# Patient Record
Sex: MALE | Race: WHITE | Employment: FULL TIME | URBAN - NONMETROPOLITAN AREA
[De-identification: names, ages, dates, MRNs, and addresses within clinical notes are randomized per-mention and may not be internally consistent; named-entity substitution may affect disease eponyms.]

---

## 2022-01-10 ENCOUNTER — HOSPITAL ENCOUNTER (INPATIENT)
Age: 37
LOS: 4 days | Discharge: HOME | DRG: 885 | End: 2022-01-14
Attending: PSYCHIATRY & NEUROLOGY | Admitting: PSYCHIATRY & NEUROLOGY

## 2022-01-10 DIAGNOSIS — F32.A DEPRESSION WITH SUICIDAL IDEATION: ICD-10-CM

## 2022-01-10 DIAGNOSIS — T14.91XA SUICIDE ATTEMPT (HCC): Primary | ICD-10-CM

## 2022-01-10 DIAGNOSIS — R45.851 DEPRESSION WITH SUICIDAL IDEATION: ICD-10-CM

## 2022-01-10 DIAGNOSIS — F10.920 ACUTE ALCOHOLIC INTOXICATION WITHOUT COMPLICATION (HCC): ICD-10-CM

## 2022-01-10 PROBLEM — F10.20 ALCOHOL USE DISORDER, SEVERE, DEPENDENCE (HCC): Chronic | Status: ACTIVE | Noted: 2022-01-10

## 2022-01-10 PROBLEM — F33.2 MDD (MAJOR DEPRESSIVE DISORDER), RECURRENT SEVERE, WITHOUT PSYCHOSIS (HCC): Chronic | Status: ACTIVE | Noted: 2022-01-10

## 2022-01-10 PROBLEM — F33.9 MDD (MAJOR DEPRESSIVE DISORDER), RECURRENT EPISODE (HCC): Status: ACTIVE | Noted: 2022-01-10

## 2022-01-10 PROBLEM — F41.1 GAD (GENERALIZED ANXIETY DISORDER): Chronic | Status: ACTIVE | Noted: 2022-01-10

## 2022-01-10 LAB
ACETAMINOPHEN LEVEL: < 5 UG/ML (ref 0–20)
ALBUMIN SERPL-MCNC: 4.9 G/DL (ref 3.5–5.1)
ALLEN TEST: POSITIVE
ALP BLD-CCNC: 75 U/L (ref 38–126)
ALT SERPL-CCNC: 30 U/L (ref 11–66)
AMPHETAMINE+METHAMPHETAMINE URINE SCREEN: NEGATIVE
ANION GAP SERPL CALCULATED.3IONS-SCNC: 13 MEQ/L (ref 8–16)
AST SERPL-CCNC: 34 U/L (ref 5–40)
BARBITURATE QUANTITATIVE URINE: NEGATIVE
BASE EXCESS (CALCULATED): -1 MMOL/L (ref -2.5–2.5)
BASOPHILS # BLD: 0.9 %
BASOPHILS ABSOLUTE: 0.1 THOU/MM3 (ref 0–0.1)
BENZODIAZEPINE QUANTITATIVE URINE: NEGATIVE
BILIRUB SERPL-MCNC: 1.2 MG/DL (ref 0.3–1.2)
BILIRUBIN DIRECT: < 0.2 MG/DL (ref 0–0.3)
BILIRUBIN URINE: NEGATIVE
BLOOD, URINE: NEGATIVE
BUN BLDV-MCNC: 8 MG/DL (ref 7–22)
CALCIUM SERPL-MCNC: 9 MG/DL (ref 8.5–10.5)
CANNABINOID QUANTITATIVE URINE: NEGATIVE
CARBON MONOXIDE, BLOOD: 4.7 % CO SAT
CHARACTER, URINE: CLEAR
CHLORIDE BLD-SCNC: 95 MEQ/L (ref 98–111)
CO2: 25 MEQ/L (ref 23–33)
COCAINE METABOLITE QUANTITATIVE URINE: NEGATIVE
COLLECTED BY:: ABNORMAL
COLOR: NORMAL
CREAT SERPL-MCNC: 0.8 MG/DL (ref 0.4–1.2)
DEVICE: ABNORMAL
EOSINOPHIL # BLD: 3.7 %
EOSINOPHILS ABSOLUTE: 0.3 THOU/MM3 (ref 0–0.4)
ERYTHROCYTE [DISTWIDTH] IN BLOOD BY AUTOMATED COUNT: 12.8 % (ref 11.5–14.5)
ERYTHROCYTE [DISTWIDTH] IN BLOOD BY AUTOMATED COUNT: 42.6 FL (ref 35–45)
ETHYL ALCOHOL, SERUM: 0.07 %
ETHYL ALCOHOL, SERUM: 0.3 %
GFR SERPL CREATININE-BSD FRML MDRD: > 90 ML/MIN/1.73M2
GLUCOSE BLD-MCNC: 103 MG/DL (ref 70–108)
GLUCOSE URINE: NEGATIVE MG/DL
HCO3: 25 MMOL/L (ref 23–28)
HCT VFR BLD CALC: 49.9 % (ref 42–52)
HEMOGLOBIN: 17.2 GM/DL (ref 14–18)
IFIO2: 2
IMMATURE GRANS (ABS): 0.04 THOU/MM3 (ref 0–0.07)
IMMATURE GRANULOCYTES: 0.4 %
KETONES, URINE: NEGATIVE
LEUKOCYTE ESTERASE, URINE: NEGATIVE
LYMPHOCYTES # BLD: 32.8 %
LYMPHOCYTES ABSOLUTE: 2.9 THOU/MM3 (ref 1–4.8)
MCH RBC QN AUTO: 31.7 PG (ref 26–33)
MCHC RBC AUTO-ENTMCNC: 34.5 GM/DL (ref 32.2–35.5)
MCV RBC AUTO: 92.1 FL (ref 80–94)
MONOCYTES # BLD: 11 %
MONOCYTES ABSOLUTE: 1 THOU/MM3 (ref 0.4–1.3)
NITRITE, URINE: NEGATIVE
NUCLEATED RED BLOOD CELLS: 0 /100 WBC
O2 SATURATION: 95 %
OPIATES, URINE: NEGATIVE
OSMOLALITY CALCULATION: 265 MOSMOL/KG (ref 275–300)
OXYCODONE: NEGATIVE
PCO2: 46 MMHG (ref 35–45)
PH BLOOD GAS: 7.35 (ref 7.35–7.45)
PH UA: 5.5 (ref 5–9)
PHENCYCLIDINE QUANTITATIVE URINE: NEGATIVE
PLATELET # BLD: 228 THOU/MM3 (ref 130–400)
PMV BLD AUTO: 9.6 FL (ref 9.4–12.4)
PO2: 80 MMHG (ref 71–104)
POTASSIUM SERPL-SCNC: 4 MEQ/L (ref 3.5–5.2)
PROTEIN UA: NEGATIVE
RBC # BLD: 5.42 MILL/MM3 (ref 4.7–6.1)
SALICYLATE, SERUM: < 0.3 MG/DL (ref 2–10)
SARS-COV-2, NAAT: NOT  DETECTED
SEG NEUTROPHILS: 51.2 %
SEGMENTED NEUTROPHILS ABSOLUTE COUNT: 4.6 THOU/MM3 (ref 1.8–7.7)
SODIUM BLD-SCNC: 133 MEQ/L (ref 135–145)
SOURCE, BLOOD GAS: ABNORMAL
SPECIFIC GRAVITY, URINE: <= 1.005 (ref 1–1.03)
TOTAL PROTEIN: 7.7 G/DL (ref 6.1–8)
TSH SERPL DL<=0.05 MIU/L-ACNC: 8.72 UIU/ML (ref 0.4–4.2)
UROBILINOGEN, URINE: 0.2 EU/DL (ref 0–1)
WBC # BLD: 8.9 THOU/MM3 (ref 4.8–10.8)

## 2022-01-10 PROCEDURE — 82375 ASSAY CARBOXYHB QUANT: CPT

## 2022-01-10 PROCEDURE — 84443 ASSAY THYROID STIM HORMONE: CPT

## 2022-01-10 PROCEDURE — 82248 BILIRUBIN DIRECT: CPT

## 2022-01-10 PROCEDURE — 85025 COMPLETE CBC W/AUTO DIFF WBC: CPT

## 2022-01-10 PROCEDURE — 99285 EMERGENCY DEPT VISIT HI MDM: CPT

## 2022-01-10 PROCEDURE — 82077 ASSAY SPEC XCP UR&BREATH IA: CPT

## 2022-01-10 PROCEDURE — 1240000000 HC EMOTIONAL WELLNESS R&B

## 2022-01-10 PROCEDURE — 36600 WITHDRAWAL OF ARTERIAL BLOOD: CPT

## 2022-01-10 PROCEDURE — 81003 URINALYSIS AUTO W/O SCOPE: CPT

## 2022-01-10 PROCEDURE — 87635 SARS-COV-2 COVID-19 AMP PRB: CPT

## 2022-01-10 PROCEDURE — 36415 COLL VENOUS BLD VENIPUNCTURE: CPT

## 2022-01-10 PROCEDURE — 80179 DRUG ASSAY SALICYLATE: CPT

## 2022-01-10 PROCEDURE — 80053 COMPREHEN METABOLIC PANEL: CPT

## 2022-01-10 PROCEDURE — 82803 BLOOD GASES ANY COMBINATION: CPT

## 2022-01-10 PROCEDURE — 2700000000 HC OXYGEN THERAPY PER DAY

## 2022-01-10 PROCEDURE — 80143 DRUG ASSAY ACETAMINOPHEN: CPT

## 2022-01-10 PROCEDURE — 80307 DRUG TEST PRSMV CHEM ANLYZR: CPT

## 2022-01-10 PROCEDURE — 94761 N-INVAS EAR/PLS OXIMETRY MLT: CPT

## 2022-01-10 PROCEDURE — 6370000000 HC RX 637 (ALT 250 FOR IP): Performed by: PSYCHIATRY & NEUROLOGY

## 2022-01-10 RX ORDER — ACETAMINOPHEN 325 MG/1
650 TABLET ORAL EVERY 4 HOURS PRN
Status: DISCONTINUED | OUTPATIENT
Start: 2022-01-10 | End: 2022-01-14 | Stop reason: HOSPADM

## 2022-01-10 RX ORDER — MAGNESIUM HYDROXIDE/ALUMINUM HYDROXICE/SIMETHICONE 120; 1200; 1200 MG/30ML; MG/30ML; MG/30ML
30 SUSPENSION ORAL EVERY 6 HOURS PRN
Status: DISCONTINUED | OUTPATIENT
Start: 2022-01-10 | End: 2022-01-14 | Stop reason: HOSPADM

## 2022-01-10 RX ORDER — TRAZODONE HYDROCHLORIDE 50 MG/1
50 TABLET ORAL NIGHTLY PRN
Status: DISCONTINUED | OUTPATIENT
Start: 2022-01-10 | End: 2022-01-14 | Stop reason: HOSPADM

## 2022-01-10 RX ORDER — POLYETHYLENE GLYCOL 3350 17 G/17G
17 POWDER, FOR SOLUTION ORAL DAILY PRN
Status: DISCONTINUED | OUTPATIENT
Start: 2022-01-10 | End: 2022-01-14 | Stop reason: HOSPADM

## 2022-01-10 RX ORDER — FLUOXETINE HYDROCHLORIDE 20 MG/1
20 CAPSULE ORAL DAILY
Status: ON HOLD | COMMUNITY
End: 2022-01-14 | Stop reason: HOSPADM

## 2022-01-10 RX ORDER — HYDROXYZINE HYDROCHLORIDE 25 MG/1
50 TABLET, FILM COATED ORAL 3 TIMES DAILY PRN
Status: DISCONTINUED | OUTPATIENT
Start: 2022-01-10 | End: 2022-01-14 | Stop reason: HOSPADM

## 2022-01-10 RX ORDER — FLUOXETINE HYDROCHLORIDE 20 MG/1
60 CAPSULE ORAL DAILY
Status: DISCONTINUED | OUTPATIENT
Start: 2022-01-10 | End: 2022-01-14 | Stop reason: HOSPADM

## 2022-01-10 RX ORDER — IBUPROFEN 400 MG/1
400 TABLET ORAL EVERY 6 HOURS PRN
Status: DISCONTINUED | OUTPATIENT
Start: 2022-01-10 | End: 2022-01-14 | Stop reason: HOSPADM

## 2022-01-10 RX ADMIN — TRAZODONE HYDROCHLORIDE 50 MG: 50 TABLET ORAL at 23:16

## 2022-01-10 RX ADMIN — HYDROXYZINE HYDROCHLORIDE 50 MG: 25 TABLET, FILM COATED ORAL at 23:17

## 2022-01-10 ASSESSMENT — ENCOUNTER SYMPTOMS
CHEST TIGHTNESS: 0
NAUSEA: 0
EYE REDNESS: 0
VOMITING: 0
COUGH: 0
BACK PAIN: 0
RHINORRHEA: 0
ABDOMINAL PAIN: 0

## 2022-01-10 ASSESSMENT — SLEEP AND FATIGUE QUESTIONNAIRES
DIFFICULTY STAYING ASLEEP: NO
RESTFUL SLEEP: NO
DIFFICULTY ARISING: NO
DO YOU USE A SLEEP AID: NO
SLEEP PATTERN: DIFFICULTY FALLING ASLEEP
DO YOU HAVE DIFFICULTY SLEEPING: YES
DIFFICULTY FALLING ASLEEP: YES

## 2022-01-10 ASSESSMENT — PAIN SCALES - GENERAL: PAINLEVEL_OUTOF10: 0

## 2022-01-10 NOTE — ED NOTES
Pt and vs reassessed. RR even and unlabored. Pt resting in bed alert. No distress noted. Sitter outside room continuously monitoring pt.  Pt stable at this time     Mary Marin, [place]  01/10/22 1167

## 2022-01-10 NOTE — ED PROVIDER NOTES
[facility] Emergency Department    CHIEF COMPLAINT       Chief Complaint   Patient presents with    Suicidal    Suicide Attempt    Alcohol Intoxication       Nurses Notes reviewed and I agree except as noted in the HPI. HISTORY OF PRESENT ILLNESS    Vlad Florez vivienne 39 y.o. male who presents to the ED for a suicide attempt. Patient states he was feeling depressed and he drank a lot of beer tonight. He then inserted a hose in his exhaust and one in his car window in an attempt to kill himself. He states he has tried to harm himself in the past.  Is compliant with his medications. No inciting event. HPI was provided by the patient    REVIEW OF SYSTEMS     Review of Systems   Constitutional: Negative for chills, fatigue and fever. HENT: Negative for congestion, ear discharge, ear pain, postnasal drip and rhinorrhea. Eyes: Negative for redness. Respiratory: Negative for cough and chest tightness. Cardiovascular: Negative for chest pain and leg swelling. Gastrointestinal: Negative for abdominal pain, nausea and vomiting. Genitourinary: Negative for difficulty urinating, dysuria, enuresis, flank pain and hematuria. Musculoskeletal: Negative for back pain and joint swelling. Skin: Negative for rash. Neurological: Negative for dizziness, light-headedness, numbness and headaches. Psychiatric/Behavioral: Positive for behavioral problems, dysphoric mood and suicidal ideas. Negative for agitation and confusion. All other systems negative except as noted. PAST MEDICAL HISTORY   History reviewed. No pertinent past medical history. SURGICALHISTORY      has no past surgical history on file. CURRENT MEDICATIONS       Previous Medications    FLUOXETINE (PROZAC) 20 MG CAPSULE    Take 20 mg by mouth daily       ALLERGIES     has No Known Allergies. FAMILY HISTORY     has no family status information on file. family history is not on file.     SOCIAL HISTORY       Social History     Socioeconomic History    Marital status: Single     Spouse name: Not on file    Number of children: Not on file    Years of education: Not on file    Highest education level: Not on file   Occupational History    Not on file   Tobacco Use    Smoking status: Not on file    Smokeless tobacco: Not on file   Substance and Sexual Activity    Alcohol use: Not on file    Drug use: Not on file    Sexual activity: Not on file   Other Topics Concern    Not on file   Social History Narrative    Not on file     Social Determinants of Health     Financial Resource Strain:     Difficulty of Paying Living Expenses: Not on file   Food Insecurity:     Worried About Running Out of Food in the Last Year: Not on file    Shannan of Food in the Last Year: Not on file   Transportation Needs:     Lack of Transportation (Medical): Not on file    Lack of Transportation (Non-Medical): Not on file   Physical Activity:     Days of Exercise per Week: Not on file    Minutes of Exercise per Session: Not on file   Stress:     Feeling of Stress : Not on file   Social Connections:     Frequency of Communication with Friends and Family: Not on file    Frequency of Social Gatherings with Friends and Family: Not on file    Attends Adventism Services: Not on file    Active Member of [address] [place] [address] [employer] or Organizations: Not on file    Attends Club or Organization Meetings: Not on file    Marital Status: Not on file   Intimate Partner Violence:     Fear of Current or Ex-Partner: Not on file    Emotionally Abused: Not on file    Physically Abused: Not on file    Sexually Abused: Not on file   Housing Stability:     Unable to Pay for Housing in the Last Year: Not on file    Number of [place] in the Last Year: Not on file    Unstable Housing in the Last Year: Not on file       PHYSICAL EXAM     INITIAL VITALS:  weight is 200 lb (90.7 kg). His oral temperature is 97.6 °F (36.4 °C).  His blood pressure is 131/87 and his pulse is 79. His respiration is 16 and oxygen saturation is 99%. Physical Exam  Vitals and nursing note reviewed. Constitutional:       General: He is not in acute distress. Appearance: Normal appearance. He is well-developed. He is not ill-appearing or diaphoretic. HENT:      Head: Normocephalic and atraumatic. Nose: Nose normal.      Mouth/Throat:      Mouth: Mucous membranes are moist.      Pharynx: Oropharynx is clear. Eyes:      General:         Right eye: No discharge. Left eye: No discharge. Conjunctiva/sclera: Conjunctivae normal.   Neck:      Trachea: No tracheal deviation. Cardiovascular:      Rate and Rhythm: Normal rate and regular rhythm. Pulses: Normal pulses. Heart sounds: Normal heart sounds. No murmur heard. No gallop. Comments: Normal capillary refill  Pulmonary:      Effort: Pulmonary effort is normal. No respiratory distress. Breath sounds: Normal breath sounds. No stridor. Abdominal:      General: Bowel sounds are normal.      Palpations: Abdomen is soft. Musculoskeletal:         General: No tenderness or deformity. Normal range of motion. Cervical back: Normal range of motion. Skin:     General: Skin is warm and dry. Capillary Refill: Capillary refill takes less than 2 seconds. Coloration: Skin is not pale. Findings: No erythema or rash. Neurological:      General: No focal deficit present. Mental Status: He is alert and oriented to person, place, and time. Cranial Nerves: No cranial nerve deficit. Psychiatric:         Mood and Affect: Mood is depressed. Thought Content: Thought content includes suicidal ideation. Thought content includes suicidal plan.          DIFFERENTIAL DIAGNOSIS:   CO poisoning, suicidal ideation, suicide attempt, depression    DIAGNOSTIC RESULTS     EKG: All EKG's are interpreted by the Emergency Department Physician who eithersigns or Co-signs this chart in the absence of a cardiologist.        RADIOLOGY: non-plainfilm images(s) such as CT, Ultrasound and MRI are read by the radiologist.  Plain radiographic images are visualized and preliminarily interpreted by the emergency physician unless otherwise stated below. No orders to display         LABS:   Labs Reviewed   BASIC METABOLIC PANEL - Abnormal; Notable for the following components:       Result Value    Sodium 133 (*)     Chloride 95 (*)     All other components within normal limits   SALICYLATE LEVEL - Abnormal; Notable for the following components:    Salicylate, Serum < 0.3 (*)     All other components within normal limits   TSH WITHOUT REFLEX - Abnormal; Notable for the following components:    TSH 8.720 (*)     All other components within normal limits   BLOOD GAS, ARTERIAL - Abnormal; Notable for the following components:    PCO2 46 (*)     All other components within normal limits   OSMOLALITY - Abnormal; Notable for the following components:    Osmolality Calc 265.0 (*)     All other components within normal limits   ACETAMINOPHEN LEVEL   CBC WITH AUTO DIFFERENTIAL   ETHANOL   HEPATIC FUNCTION PANEL   CARBOXYHEMOGLOBIN   ANION GAP   GLOMERULAR FILTRATION RATE, ESTIMATED   URINE DRUG SCREEN   URINE RT REFLEX TO CULTURE   ETHANOL       EMERGENCY DEPARTMENT COURSE:   Vitals:    Vitals:    01/10/22 0319 01/10/22 0420 01/10/22 0449   BP: (!) 150/111 131/87    Pulse: 76 79    Resp: 18 18 16   Temp: 97.6 °F (36.4 °C)     TempSrc: Oral     SpO2: 97% 99%    Weight: 200 lb (90.7 kg)                                  MDM    Patient seen evaluated emergency room for suicide attempt. Patient attempted to asphyxiate himself by carbon monoxide poisoning. Carboxyhemoglobin is 4.5 which is close to normal.  Patient is not having any symptoms of carbon monoxide poisoning at this time. Appropriate labs are ordered. Patient's EtOH level is 0.30. He will be sober for an total of 11 hours.   Care will be transferred to my colleague Jacques Sanchez, CNP for MINOR evaluation and repeat EtOH level for sobriety. Patient has been [address]. Medications - No data to display    Please note that the patient was evaluated during a pandemic. All efforts were made for HIPPA compliance as well as provision of appropriate care. Patient was seen independently by myself. The patient's final impression and disposition and plan was determined by myself. Strict return precautions and follow up instructions were discussed with the patient prior to discharge, with which the patient agrees. Physical assessment findings, diagnostic testing(s) if applicable, and vital signs reviewed with patient/patient representative. Questions answered. Medications asdirected, including OTC medications for supportive care. Education provided on medications. Differential diagnosis(s) discussed with patient/patient representative. Home care/self care instructions reviewed withpatient/patient representative. Patient is to follow-up with family care provider in 2-3 days if no improvement. Patient is to go to the emergency department if symptoms worsen. Patient/patient representative isaware of care plan, questions answered, verbalizes understanding and is in agreement. CRITICAL CARE:   None    CONSULTS:  None    PROCEDURES:  None    FINAL IMPRESSION     1. Suicide attempt ([address]    2. Acute alcoholic intoxication without complication ([address]    3. Depression with suicidal ideation          DISPOSITION/PLAN   DISPOSITION        PATIENT REFERREDTO:  No follow-up provider specified.     DISCHARGE MEDICATIONS:  New Prescriptions    No medications on file       (Please note that portions of this note were completed with a voice recognition program.  Efforts were made to edit the dictations but occasionally words are mis-transcribed.)         Anabelle Reed, HEAVEN - [facility]AVEN [facility] - [facility]  01/10/22 2971

## 2022-01-10 NOTE — ED NOTES
Pt given hospital scrubs and belongings placed at charge desk. Pt family at bedside and sitter outside pt door continuously monitoring pt.       Jonatan Yuan RN  01/10/22 0281

## 2022-01-10 NOTE — ED NOTES
Pt and vs reassessed. RR even and unlabored. Pt resting in bed with eyes closed and responds to voice. Pt denies any needs and updated on POC. No distress noted. Pt stable at this time.  Sitter outside room continuously monitoring pt     [facility]  01/10/22 5047

## 2022-01-10 NOTE — ED NOTES
Pt resting in safe room with eyes closed and appears to be sleeping. RR even and unlabored. No distress noted. Pt stable at this time.  Continuous monitoring in place by [place] police     Leellen Wise, [place]  01/10/22 8171

## 2022-01-10 NOTE — ED PROVIDER NOTES
[facility] [address] [address] EMERGENCY DEPT  EMERGENCY MEDICINE     Pt Name: Alize Watson  MRN: [id number]  Lynngfjosephine 1985  Date of evaluation: 1/10/2022  PCP:    No primary care provider on file. Provider: HEAVEN Redmond CNP    CHIEF COMPLAINT       Chief Complaint   Patient presents with    Suicidal    Suicide Attempt    Alcohol Intoxication         HISTORY OF PRESENT ILLNESS       I have reviewed the notes, assessments, and/or procedures performed by Nolberto Cruise, I concur with her/his documentation of Alize Watson. Care was turned over to this provider at 6 AM on 1/10/2022. Patient remained stable throughout the day. The only orders requested from me and this time was a COVID testing. Psychiatry is planning on admitting patient to for ED for suicidal ideation. For review of systems, HPI and physical exam please see Aman Santiago note. Triage notes and Nursing notes were reviewed by myself. Any discrepancies are addressed above. PAST MEDICAL HISTORY   History reviewed. No pertinent past medical history. SURGICAL HISTORY     History reviewed. No pertinent surgical history. CURRENT MEDICATIONS       Previous Medications    FLUOXETINE (PROZAC) 20 MG CAPSULE    Take 20 mg by mouth daily       ALLERGIES     No Known Allergies    FAMILY HISTORY     History reviewed. No pertinent family history.      SOCIAL HISTORY       Social History     Socioeconomic History    Marital status: Single     Spouse name: None    Number of children: None    Years of education: None    Highest education level: None   Occupational History    None   Tobacco Use    Smoking status: None    Smokeless tobacco: None   Substance and Sexual Activity    Alcohol use: None    Drug use: None    Sexual activity: None   Other Topics Concern    None   Social History Narrative    None     Social Determinants of Health     Financial Resource Strain:     Difficulty of Paying Living Expenses: Not on file   Food Insecurity:     Worried About [facility] in the Last Year: Not on file    Shannan of Food in the Last Year: Not on file   Transportation Needs:     Lack of Transportation (Medical): Not on file    Lack of Transportation (Non-Medical): Not on file   Physical Activity:     Days of Exercise per Week: Not on file    Minutes of Exercise per Session: Not on file   Stress:     Feeling of Stress : Not on file   Social Connections:     Frequency of Communication with Friends and Family: Not on file    Frequency of Social Gatherings with Friends and Family: Not on file    Attends Taoist Services: Not on file    Active Member of [address] [place] [address] [email or web address] or Organizations: Not on file    Attends Club or Organization Meetings: Not on file    Marital Status: Not on file   Intimate Partner Violence:     Fear of Current or Ex-Partner: Not on file    Emotionally Abused: Not on file    Physically Abused: Not on file    Sexually Abused: Not on file   Housing Stability:     Unable to Pay for Housing in the Last Year: Not on file    Number of [place] in the Last Year: Not on file    Unstable Housing in the Last Year: Not on file       REVIEW OF SYSTEMS     Review of Systems    Except as noted above the remainder of the review of systems was reviewed and is negative.   SCREENINGS        Rick Coma Scale  Eye Opening: Spontaneous  Best Verbal Response: Oriented  Best Motor Response: Obeys commands  [place] Coma Scale Score: 15               PHYSICAL EXAM    (up to 7 for level 4, 8 or more for level 5)     ED Triage Vitals [01/10/22 0319]   BP Temp Temp Source Pulse Resp SpO2 Height Weight   (!) 150/111 97.6 °F (36.4 °C) Oral 76 18 97 % -- 200 lb (90.7 kg)       Physical Exam      DIAGNOSTIC RESULTS     EKG:(none if blank)  All EKGs are interpreted by the Emergency Department Physician who either signs or Co-signs this chart in the absence of a cardiologist.        RADIOLOGY: (none if blank)   I directly visualized the following images and reviewed the radiologist interpretations. Interpretation per the Radiologist below, if available at the time of this note:  No orders to display       LABS:  [address] - Abnormal; Notable for the following components:       Result Value    Sodium 133 (*)     Chloride 95 (*)     All other components within normal limits   SALICYLATE LEVEL - Abnormal; Notable for the following components:    Salicylate, Serum < 0.3 (*)     All other components within normal limits   TSH WITHOUT REFLEX - Abnormal; Notable for the following components:    TSH 8.720 (*)     All other components within normal limits   BLOOD GAS, ARTERIAL - Abnormal; Notable for the following components:    PCO2 46 (*)     All other components within normal limits   OSMOLALITY - Abnormal; Notable for the following components:    Osmolality Calc 265.0 (*)     All other components within normal limits   COVID-19, RAPID   ACETAMINOPHEN LEVEL   CBC WITH AUTO DIFFERENTIAL   ETHANOL   HEPATIC FUNCTION PANEL   URINE DRUG SCREEN   URINE RT REFLEX TO CULTURE   CARBOXYHEMOGLOBIN   ANION GAP   GLOMERULAR FILTRATION RATE, ESTIMATED   ETHANOL       All other labs were within normal range or not returned as of this dictation. Please note, any cultures that may have been sent were not resulted at the time of this patient visit. EMERGENCY DEPARTMENT COURSE and Medical Decision Making:     Vitals:    Vitals:    01/10/22 0420 01/10/22 0449 01/10/22 0520 01/10/22 0940   BP: 131/87  122/83 121/74   Pulse: 79  82 79   Resp: 18 16 18 17   Temp:       TempSrc:       SpO2: 99%  99% 98%   Weight:           PROCEDURES: (None if blank)  Procedures       MDM    Strict return precautions and follow up instructions were discussed with the patient with which the patient agrees    ED Medications administered this visit:  Medications - No data to display      FINAL IMPRESSION      1. Suicide attempt ([address]    2.  Acute alcoholic intoxication without

## 2022-01-10 NOTE — ED TRIAGE NOTES
Pt presents to the ED with c/o suicidal ideation and alcohol intoxication. Pts family states pt was at home tonight when he called his sister around 46 saying that he wanted help. Pt family states pt was in his car when he hooked a hose up to his exhaust pipe and taped all his windows shut. Pt states he is unsure how long he was in the ar but states it was probably around 30 minutes. Pt states he has been struggling with wanting to hurt himself for 36 years. pts family states pt is living with his girlfriend and has had increased stress lately. pts family states the pts girlfriend let her pregnant daughter move in and does not care what the pt thinks. Pt states he drank around 20 beers tonight. Pt denies any symptoms at this time.  RR even and unlabored

## 2022-01-10 NOTE — ED NOTES
Patient placed in safe room that is ligature resistant with continuous monitoring in place. Provider notified, requested an assessment by behavioral health . Patient belongings secured in a locked lockers outside of the room. Explained suicide prevention precautions to the patient including constant observer.         Nunu Valentino RN  01/10/22 9459

## 2022-01-10 NOTE — ED NOTES
Pt transported to [facility] at this time. Floor notified.       Ponce Wang  01/10/22 1847       Paradise Chavez  01/10/22 1847

## 2022-01-10 NOTE — PROGRESS NOTES
Chief Complaint: Suicidal, Suicide Attempt & Alcohol Intoxication    Provisional Diagnosis:    Unspecified Depressive Disorder     Risk, Psychosocial and Contextual Factors:  Substance use     Current MH Treatment:  Denies             Present Suicidal Behavior:       Verbal: \"I do not know\"                                                    Attempt: Yes     Access to Weapons:  denies     Current Suicide Risk: Low, Moderate or High:  High     Past Suicidal Behavior:                 Verbal: Yes    Attempt: Yes, previous attempts. Self-Injurious/Self-Mutilation: Yes, previous     Traumatic Event Within Past 2 Weeks:   denies     Current Abuse: denies     Legal: denies     Violence: denies     Protective Factors:  Adequate support system, employment, and positive leisure interests     Housing: Resides with his girlfriend     CPAP/Oxygen/Ambulation Difficulties:      Basic Vital Signs Normal?: Check with Patients Nurse prior to [address]?: Check with Patients Nurse prior to Calling Psychiatry     Clinical Summary:       Patient is a 39year old male who presents to the ED for an evaluation. Patient arrived voluntarily, however, the medical provider completed an [facility] on patient. Per [facility], \"The pt drank a lot of alcohol and pt a hose in the exhaust and then in his window and tried to kill himself\". Patient had a BAL of .30 upon arrival. At time of assessment, patient BAL was . 07. Spoke with patient who confirms the above. Reports he attempted suicide and was \"thinking about it all day\". Reports he told his girlfriend who called his mother who came over and found the patient in his car with a hose up hooked up to his exhaust pipe and taped all his windows shut. Per RN note, he is unsure how long he was in the car but states it was probably around 30 minutes. Patient reports his sister then brought the patient to the ED. Reports a history of suicidal thoughts and previous attempts.  Pt reports he had the supplies at home. 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 Pt reports daily alcohol use. Per RN report, pt reportedly had 20 beers prior to arrival. Per RN report, pt resides with his girlfriend and his girlfriend's daughter who is pregnant moved in which is a stressor. Homicidal thoughts and/or plans denied. Hallucinations denied. No delusions noted. Pt cooperative. Level of Care Disposition:    4615  Checked in with pt. Denied any needs at this time. Patient aware of current POC. 1400  Checked in with pt. Provided with box of food. Updated on POC. Pt cooperative. 12  Pt family visiting at this time per pt approval.  Michael Montero served Dr. Clint Laurent. Cheryl 37 with Dr. Clint Laurent. Patient to be admitted to 4E. Pt is under an [address] [place] order. Cheryl 37 with medical provider. Informed her of admit to 4E. Pt remains medically stable. 0562  Report provided to SALO Nelson RN on 4E. Bed to be assigned. 1  Informed pt and his family of admission.

## 2022-01-10 NOTE — ED NOTES
ED nurse-to-nurse bedside report    Chief Complaint   Patient presents with    Suicidal    Suicide Attempt    Alcohol Intoxication      LOC: alert and orientated to name, place, date  Vital signs   Vitals:    01/10/22 0319 01/10/22 0420 01/10/22 0449 01/10/22 0520   BP: (!) 150/111 131/87  122/83   Pulse: 76 79  82   Resp: 18 18 16 18   Temp: 97.6 °F (36.4 °C)      TempSrc: Oral      SpO2: 97% 99%  99%   Weight: 200 lb (90.7 kg)         Pain:    Pain Interventions: none  Pain Goal: NA  Oxygen: No    Current needs required RA   Telemetry: No  LDAs:   Peripheral IV 01/10/22 Left Antecubital (Active)   Site Assessment Clean;Dry; Intact 01/10/22 0520   Line Status Normal saline locked 01/10/22 0520   Dressing Status Clean;Dry; Intact 01/10/22 0520   Dressing Intervention New 01/10/22 0328     Continuous Infusions:   Mobility: Independent  Rollins Fall Risk Score: No flowsheet data found.   Fall Interventions: side rails up, call light in reach, wheels locked  Report given to: Florencia Harrington RN  01/10/22 9470

## 2022-01-11 LAB
BASOPHILS # BLD: 0.8 %
BASOPHILS ABSOLUTE: 0 THOU/MM3 (ref 0–0.1)
EOSINOPHIL # BLD: 3.9 %
EOSINOPHILS ABSOLUTE: 0.2 THOU/MM3 (ref 0–0.4)
ERYTHROCYTE [DISTWIDTH] IN BLOOD BY AUTOMATED COUNT: 12.8 % (ref 11.5–14.5)
ERYTHROCYTE [DISTWIDTH] IN BLOOD BY AUTOMATED COUNT: 42.8 FL (ref 35–45)
HCT VFR BLD CALC: 48.9 % (ref 42–52)
HEMOGLOBIN: 16.9 GM/DL (ref 14–18)
IMMATURE GRANS (ABS): 0.03 THOU/MM3 (ref 0–0.07)
IMMATURE GRANULOCYTES: 0.5 %
LYMPHOCYTES # BLD: 22.5 %
LYMPHOCYTES ABSOLUTE: 1.4 THOU/MM3 (ref 1–4.8)
MCH RBC QN AUTO: 31.7 PG (ref 26–33)
MCHC RBC AUTO-ENTMCNC: 34.6 GM/DL (ref 32.2–35.5)
MCV RBC AUTO: 91.7 FL (ref 80–94)
MONOCYTES # BLD: 13.5 %
MONOCYTES ABSOLUTE: 0.8 THOU/MM3 (ref 0.4–1.3)
NUCLEATED RED BLOOD CELLS: 0 /100 WBC
PLATELET # BLD: 179 THOU/MM3 (ref 130–400)
PMV BLD AUTO: 9.7 FL (ref 9.4–12.4)
RBC # BLD: 5.33 MILL/MM3 (ref 4.7–6.1)
SEG NEUTROPHILS: 58.8 %
SEGMENTED NEUTROPHILS ABSOLUTE COUNT: 3.6 THOU/MM3 (ref 1.8–7.7)
WBC # BLD: 6.1 THOU/MM3 (ref 4.8–10.8)

## 2022-01-11 PROCEDURE — 1240000000 HC EMOTIONAL WELLNESS R&B

## 2022-01-11 PROCEDURE — 85025 COMPLETE CBC W/AUTO DIFF WBC: CPT

## 2022-01-11 PROCEDURE — 6370000000 HC RX 637 (ALT 250 FOR IP): Performed by: PSYCHIATRY & NEUROLOGY

## 2022-01-11 PROCEDURE — 36415 COLL VENOUS BLD VENIPUNCTURE: CPT

## 2022-01-11 RX ADMIN — HYDROXYZINE HYDROCHLORIDE 50 MG: 25 TABLET, FILM COATED ORAL at 08:31

## 2022-01-11 RX ADMIN — FLUOXETINE HYDROCHLORIDE 60 MG: 20 CAPSULE ORAL at 08:32

## 2022-01-11 ASSESSMENT — PAIN SCALES - GENERAL
PAINLEVEL_OUTOF10: 0
PAINLEVEL_OUTOF10: 0

## 2022-01-11 NOTE — PROGRESS NOTES
Psychosocial Assessment    Current Level of Psychosocial Functioning     Independent XXX  Dependent    Minimal Assist     Comments:      Psychosocial High Risk Factors (check all that apply)    Unable to obtain meds XXX  Chronic illness/pain    Substance abuse XXX  Lack of Family Support   Financial stress   Isolation   Inadequate Community Resources  Suicide attempt(s) XXX  Not taking medications   Victim of crime   Developmental Delay  Unable to manage personal needs    Age 72 or older   Homeless  No transportation   Readmission within 30 days  Unemployment  Traumatic Event    Family/Supports identified: Girlfriend and family. Sexual Orientation: Heterosexual    Patient Strengths: Positive supports    Patient Barriers: Unable to pay for medication, suicide ideation/attempts history, alcohol misuse    Safety plan: Ongoing, Q15 minute safety checks    CMHC/MH history: See clinical summary for details    Plan of Care:  medication management, group/individual therapies, family meetings, psycho -education, treatment team meetings to assist with stabilization    Initial Discharge Plan: Back to home with girlfriend with possible follow-up at [facility]. Clinical Summary: Pt is a 38 yo male that was admitted from the ED to the unit involuntarily for attempting suicide by attaching a hose to his car's exhaust and running it through his window. Pt stated that he has attempted to take his life 6-8 times since he was 21-24 yo. Pt denies currently wanting to harm himself or others. Pt stated that he drinks alcohol everyday and has done so for years. Pt is employed at [employer] in [employer] and currently resides in [place] with his girlfriend.

## 2022-01-11 NOTE — PROGRESS NOTES
Daily Progress Note  Tenzin Alvarez MD  1/11/2022    Reviewed patient's current plan of care and vital signs with nursing staff. Sleep:  6.5 hours last night  Attending groups: No  No reported Suicidal thought; No interaction with peers & staff; Mood 5 on a scale of 1 to 10 with 10 is feeling normal; some hope. SUBJECTIVE:    Patient is feeling better. SUICIDAL IDEATION denies suicidal ideation. Patient does not have medication side effects. ROS: Patient has new complaints:  No  Sleeping adequately:  Yes  Visitors: Yes    Mental Status Examination:  Patient is cooperative. Speech: Normal rate and tone. No abnormal movements, tics or mannerisms. Mood depressed; affect Restricted. Suicidal ideation Absent. Homicidal ideations Absent. Hallucinations Absent. Delusions Absent. Thought Content: normal. Thought Processes: Goal-Directed. Alert and oriented X 3. Attention and concentration fair. MEMORY intact. Insight and Judgement Limited. Data   height is 5' 11\" (1.803 m) and weight is 190 lb (86.2 kg). His tympanic temperature is 98.1 °F (36.7 °C). His blood pressure is 144/91 (abnormal) and his pulse is 70. His respiration is 16 and oxygen saturation is 95%. Labs:   Admission on 01/10/2022   Component Date Value Ref Range Status    Acetaminophen Level 01/10/2022 < 5.0  0.0 - 20.0 ug/mL Final    Performed at [address] [place] [address], [address]    Sodium 01/10/2022 133* 135 - 145 meq/L Final    Potassium 01/10/2022 4.0  3.5 - 5.2 meq/L Final    Comment: Low level specimen hemolysis is present as indicated by the interference  level index on the Roche analyzer. The reported K+ level may be falsely  increased. If clinically warranted, recollection of the specimen is  suggested.       Chloride 01/10/2022 95* 98 - 111 meq/L Final    CO2 01/10/2022 25  23 - 33 meq/L Final    Glucose 01/10/2022 103  70 - 108 mg/dL Final    BUN 01/10/2022 8  7 - 22 mg/dL Final    CREATININE 01/10/2022 0.8  0.4 - 1.2 mg/dL Final    Calcium 01/10/2022 9.0  8.5 - 10.5 mg/dL Final    Performed at [address] [place] [id number]    WBC 01/10/2022 8.9  4.8 - 10.8 thou/mm3 Final    RBC 01/10/2022 5.42  4.70 - 6.10 mill/mm3 Final    Hemoglobin 01/10/2022 17.2  14.0 - 18.0 gm/dl Final    Hematocrit 01/10/2022 49.9  42.0 - 52.0 % Final    MCV 01/10/2022 92.1  80.0 - 94.0 fL Final    MCH 01/10/2022 31.7  26.0 - 33.0 pg Final    MCHC 01/10/2022 34.5  32.2 - 35.5 gm/dl Final    RDW-CV 01/10/2022 12.8  11.5 - 14.5 % Final    RDW-SD 01/10/2022 42.6  35.0 - 45.0 fL Final    Platelets 04/96/9415 228  130 - 400 thou/mm3 Final    MPV 01/10/2022 9.6  9.4 - 12.4 fL Final    Seg Neutrophils 01/10/2022 51.2  % Final    Lymphocytes 01/10/2022 32.8  % Final    Monocytes 01/10/2022 11.0  % Final    Eosinophils 01/10/2022 3.7  % Final    Basophils 01/10/2022 0.9  % Final    Immature Granulocytes 01/10/2022 0.4  % Final    Segs Absolute 01/10/2022 4.6  1.8 - 7.7 thou/mm3 Final    Lymphocytes Absolute 01/10/2022 2.9  1.0 - 4.8 thou/mm3 Final    Monocytes Absolute 01/10/2022 1.0  0.4 - 1.3 thou/mm3 Final    Eosinophils Absolute 01/10/2022 0.3  0.0 - 0.4 thou/mm3 Final    Basophils Absolute 01/10/2022 0.1  0.0 - 0.1 thou/mm3 Final    Immature Grans (Abs) 01/10/2022 0.04  0.00 - 0.07 thou/mm3 Final    nRBC 01/10/2022 0  /100 wbc Final    Performed at [address] [place] [address], [address]    ETHYL ALCOHOL, SERUM 01/10/2022 0.30  0.00 % Final    Performed at [address] [place] [id number]    Albumin 01/10/2022 4.9  3.5 - 5.1 g/dL Final    Total Bilirubin 01/10/2022 1.2  0.3 - 1.2 mg/dL Final    Bilirubin, Direct 01/10/2022 <0.2  0.0 - 0.3 mg/dL Final    Alkaline Phosphatase 01/10/2022 75  38 - 126 U/L Final    AST 01/10/2022 34  5 - 40 U/L Final    ALT 01/10/2022 30  11 - 66 U/L Final    Total Protein 01/10/2022 7.7  6.1 - 8.0 g/dL Final Performed at [address] [place] [address], [address]    Salicylate, Serum 24/56/5689 < 0.3* 2.0 - 10.0 mg/dL Final    Performed at [address] [place] [address], [address] TSH 01/10/2022 8.720* 0.400 - 4.200 uIU/mL Final    Performed at [address] [place] [address], [address]    AMPHETAMINE+METHAMPHETAMINE URINE * 01/10/2022 Negative  NEGATIVE Final    Barbiturate Quant, Ur 01/10/2022 Negative  NEGATIVE Final    Benzodiazepine Quant, Ur 01/10/2022 Negative  NEGATIVE Final    Cannabinoid Quant, Ur 01/10/2022 Negative  NEGATIVE Final    Cocaine Metab Quant, Ur 01/10/2022 Negative  NEGATIVE Final    Opiates, Urine 01/10/2022 Negative  NEGATIVE Final    Oxycodone 01/10/2022 Negative  NEGATIVE Final    PCP Quant, Ur 01/10/2022 Negative  NEGATIVE Final    Comment: A \"Negative\" result for a drug abuse screen test indicates     that the drug concentration is below the following cutoffs:            Amphetamine/Methamphetamine     1000 ng/ml            Barbiturate                      200 ng/ml            Benzodiazapine                   200 ng/ml            Cannabinoids                      50 ng/ml            Cocaine Metabolite               300 ng/ml            Opiates                          300 ng/ml            Oxycodone                        100 ng/ml            Phencyclidine                     25 ng/ml  A \"Positive\" result for a drug abuse screen test should be     considered presumptive positive until/unless confirmed     by another method. (Additional request)  Quantitative values from a reference laboratory are     available upon additional request.  These results are for medical use only.   Performed at [facility], [address]      Glucose, Ur 01/10/2022 NEGATIVE  NEGATIVE mg/dl Final    Bilirubin Urine 01/10/2022 NEGATIVE  NEGATIVE Final    Ketones, Urine 01/10/2022 NEGATIVE  NEGATIVE Final    Specific Gravity, Urine 01/10/2022 <= 1.005  1.002 - 1.030 Final    Blood, Urine 01/10/2022 NEGATIVE  NEGATIVE Final    pH, UA 01/10/2022 5.5  5.0 - 9.0 Final    Protein, UA 01/10/2022 NEGATIVE  NEGATIVE Final    Urobilinogen, Urine 01/10/2022 0.2  0.0 - 1.0 eu/dl Final    Nitrite, Urine 01/10/2022 NEGATIVE  NEGATIVE Final    Leukocyte Esterase, Urine 01/10/2022 NEGATIVE  NEGATIVE Final    Color, UA 01/10/2022 STRAW  STRAW-YELLOW Final    Character, Urine 01/10/2022 CLEAR  CLEAR-SL CLOUD Final    Performed at [address] [place] [address], [address]    pH, Blood Gas 01/10/2022 7.35  7.35 - 7.45 Final    PCO2 01/10/2022 46* 35 - 45 mmhg Final    PO2 01/10/2022 80  71 - 104 mmhg Final    HCO3 01/10/2022 25  23 - 28 mmol/l Final    Base Excess (Calculated) 01/10/2022 -1.0  -2.5 - 2.5 mmol/l Final    O2 Sat 01/10/2022 95  % Final    IFIO2 01/10/2022 2   Final    DEVICE 01/10/2022 Cannula   Final    Fred Test 01/10/2022 Positive   Final    Source: 01/10/2022 R Radial   Final    COLLECTED BY: 01/10/2022 [id number]   Final    Performed at [address], [address]    Carbon Monoxide, Blood 01/10/2022 4.7  % CO SAT Final    Comment: Normal Range: Nonsmokers = <1.5%                Smokers    = 1.5-5.0%  Performed at [address], [address]      Anion Gap 01/10/2022 13.0  8.0 - 16.0 meq/L Final    Comment: ANION GAP = Sodium -(Chloride + CO2)  Performed at [address], [address]      Corine Herrera Filt Rate 01/10/2022 >90  ml/min/1.73m2 Final    Comment: Stage Description                    GFR, ml/min/1.73 m2   -   At increased risk               > or = 60 (with chronic                                       kidney disease risk factors)   1   Normal or increased GFR         > or = 90   2   Mildly or decreased GFR         60 - 89   3   Moderately decreased GFR        30 - 59   4   Severely decreased GFR          15 - 29   5 Kidney failure                  <15 (or dialysis)  Estimated GFR calculated using abbreviated MDRD formula as  recommended by [employer]. Calculation based  upon serum creatinine and adjusted for age, gender & race. Yesenia. Internal Med., Vol. 139 (2) pg 137-147. Performed at [address] [place] [address], [address]      Osmolality Calc 01/10/2022 265.0* 275.0 - 300.0 mOsmol/kg Final    Performed at [address] [place] [address], [address], SERUM 01/10/2022 0.07  0.00 % Final    Performed at [facility], [address]    SARS-CoV-2, NAAT 01/10/2022 NOT  DETECTED  NOT DETECTED Final    Comment: Rapid NAAT:   Negative results should be treated as presumptive and,  if inconsistent with clinical signs and symptoms or necessary for  patient management, should be tested with an alternative molecular  assay. Negative results do not preclude SARS-CoV-2 infection and  should not be used as the sole basis for patient management decisions. This test has been authorized by the FDA under an Emergency Use  Authorization (EUA) for use by authorized laboratories.     Fact sheet for Healthcare Providers:  Sarmad.jhon  Fact sheet for Patients: Sarmad.jhon    METHODOLOGY: Isothermal Nucleic Acid Amplification  Performed at [address] [place] [address], [facility]              Medications  Current Facility-Administered Medications: acetaminophen (TYLENOL) tablet 650 mg, 650 mg, Oral, Q4H PRN  ibuprofen (ADVIL;MOTRIN) tablet 400 mg, 400 mg, Oral, Q6H PRN  polyethylene glycol (GLYCOLAX) packet 17 g, 17 g, Oral, Daily PRN  aluminum & magnesium hydroxide-simethicone (MAALOX) 200-200-20 MG/5ML suspension 30 mL, 30 mL, Oral, Q6H PRN  traZODone (DESYREL) tablet 50 mg, 50 mg, Oral, Nightly PRN  hydrOXYzine (ATARAX) tablet 50 mg, 50 mg, Oral, TID PRN  FLUoxetine (PROZAC) capsule 60 mg, 60 mg, Oral, Daily    ASSESSMENT  MDD (major depressive disorder), recurrent severe, without psychosis ([facility] [address]     PLAN  Patient's symptoms are improving  Continue with current medications. Attempt to develop insight  Psycho-education conducted. Supportive Therapy conducted.

## 2022-01-11 NOTE — H&P
reports he may have attempted suicide five times before  either by cutting himself superficially, hanging, overdose, etc.  He  says in his early 25s he was on duloxetine for a few years. Again, he  has been on fluoxetine 20 mg for the past two years prescribed by his  nurse practitioner. He has been to [facility] before for court-ordered  therapy. FAMILY HISTORY:  Mother and two sisters with depression and anxiety. Father is an alcoholic. No family history of illicit drugs and no  family history of suicide attempt. SOCIAL HISTORY:  The patient was born in [facility], raised in [place]. Parents are . They live in [place]. He has two full sisters,  one older and one younger. He is close to his family, but his primary  support is his girlfriend. He has been dating his girlfriend for about  eight years. He lives with his girlfriend. He graduated from high  school. He may have a semester of college. He has worked mainly in  [employer]. He has been working at [employer] in [place] [facility] [place] for two months. Prior, he worked in another factory for six to seven years. Before  working in [employer], he worked in a golf course for five years. He reports a history of drinking alcohol, started at 15. By 18, he  started drinking heavy. He says for the past few years he may be  drinking 20 beers two to three times a week. He also has a history of  illicit drug use, started at 25. He was using pills, cocaine, meth, and  marijuana. He has not done heavy drugs since his early 25s, but he  continues to smoke marijuana once or twice a month. Urine tox screen is  negative, but his alcohol level upon presentation was 0.3. He quit  smoking cigarettes three years ago. He has multiple under-age  consumption and he had a DUI about 10 years ago. He does not attend  [employer], but believes in God. MEDICAL AND SURGICAL HISTORY:  Possible hypertension. MEDICATIONS:  Fluoxetine 20 mg daily.     ALLERGIES:  NO KNOWN DRUG ALLERGIES. REVIEW OF SYSTEMS:  10-system review is negative except as noted above. PHYSICAL EXAMINATION:  HEENT:  Within normal limits. NECK:  Supple. No thyromegaly. CARDIOVASCULAR:  Normal sinus rhythm. No murmur or gallop on  auscultation. LUNGS:  Clear. No wheezing or rales on auscultation. ABDOMEN:  Soft. Bowel sounds are present. RECTAL/GENITAL:  Not examined. EXTREMITIES:  Full range of motion in all four extremities. Peripheral  pulses are present. NEUROLOGIC:  Cranial nerves II through XII are grossly intact. Reflexes  are equal bilaterally. MENTAL STATUS EXAMINATION:  The patient appears stated age, dressed in  hospital gown. He has good eye contact. Poor grooming and hygiene. He  is cooperative with the interview, but he appears to minimize his  symptoms. Speech clear, coherent, and spontaneous. Mood depressed and  anxious and affect restricted. He denies current suicidal thoughts. No  homicidal ideations. No psychosis. No major mood swings. No flight of  ideas and no racing thoughts. Thought process is goal oriented. He is  alert and oriented x3. He has fair attention and concentration. Memory  appears to be intact as tested within the context of the interview. Intelligence appears average. Judgment and insight are poor. DIAGNOSES:  1.  Major depressive disorder, recurrent, severe, without psychotic  features. 2.  Generalized anxiety disorder. 3.  Alcohol use disorder, severe. 4.  Rule out alcohol-induced mood disorder. 5.  Possible hypertension. 6.  Chronic alcohol use. RECOMMENDATIONS:  1. Admit to the unit. 2.  Routine labs ordered. 3.  Resume and increase fluoxetine. Add hydroxyzine and trazodone. 4.  Risks and benefits of psychotropics discussed as well as alternative  treatment. 5.  Support and reassurance given. 6.  Milieu and group therapy to develop insight to psychiatric illness  and better coping mechanism.   7.  Upon discharge, he will be referred for outpatient management. PATIENT'S STRENGTH:  His girlfriend.         Christiano Irvin M.D.    D: 01/10/2022 22:38:01       T: 01/11/2022 0:34:29     [email or web address]  Job#: [id number]     Doc#: [id number]    CC:

## 2022-01-11 NOTE — PROGRESS NOTES
Behavioral Health   Admission Note     Admission Type:   Admission Type: Involuntary    Reason for admission:  Reason for Admission: \"I tried to kill myself with a hose and exhaust.\"    PATIENT STRENGTHS:  Strengths: Positive Support    Patient Strengths and Limitations:  Limitations: Difficulty problem solving/relies on others to help solve problems    Addictive Behavior:        Medical Problems:   Past Medical History:   Diagnosis Date    Psychiatric problem        Status EXAM:  Status and Exam  Normal: No  Facial Expression: Flat,Sad  Affect: Blunt,Constricted  Level of Consciousness: Alert  Mood:Normal: No  Mood: Depressed,Anxious,Sad  Motor Activity:Normal: Yes  Interview Behavior: Evasive  Preception: [place] to Person,[place] to Time,[place] to Place,[place] to Situation  Attention:Normal: Yes  Thought Processes: Circumstantial  Thought Content:Normal: Yes  Hallucinations: None  Delusions: No  Memory:Normal: Yes  Insight and Judgment: No  Insight and Judgment: Poor Judgment,Poor Insight,Unmotivated  Present Suicidal Ideation: No  Present Homicidal Ideation: No    Pt admitted with followings belongings:  Vision - Corrective Lenses: Glasses  Clothing: Footwear,Jacket / coat,Shirt,Pajamas (SHOES, PJ PANTS, TSHIRT, COAT,)  Other Valuables: Cell phone,Wallet (WALLET, CELLPHONE / ,  $000)     Admission order obtained yes. Valuables sent home with no one. Valuables placed in safe in security envelope, number:  n/a. Patient's home medications were n/a. Patient oriented to surroundings and program expectations and copy of patient rights given. Received admission packet:  yes. Consents reviewed, signed yes. \"An Important Message from [place] About Your Rights\" form reviewed, signed n/a. Refused no. Patient verbalize understanding:  yes. Patient education on precautions: yes           Patient screened positive for suicide risk on CSSR-S (\"yes\" to question #4, 5, OR 6)  yes. Physician notified of risk score. Orders received no new orders, 15 minute checks in place per protocol . 2 person skin assessment completed upon admission refused. Explained patients right to have family, representative or physician notified of their admission. Patient has Declined for physician to be notified. Patient has Declined for family/representative to be notified. Patient reportedly connected a hose to an exhaust pipe of his car and let the car run with the windows up for approximately 30 minutes. Mom apparently found he patient this way. Patient denies any recent stressors to make him feel suicidal and is very evasive but does admit this was a suicide attempt and he had been thinking about it all day. Patient denies current suicidal thoughts but does say he is anxious and depressed. Patient also denies homicidal thoughts or hallucinations. Patient has a flat and constricted affect and is withdrawn. Patient verbally contracted for safety.                Carlos Henriquez RN

## 2022-01-11 NOTE — PROGRESS NOTES
Patient is cooperative, compliant with medications, attended groups and eating well.  On the fringe with peers

## 2022-01-11 NOTE — FLOWSHEET NOTE
Spiritual Support Group Note    Number of Participants in Group: 10                               Time: 1430    Goal: Relief from isolation and loneliness             Miri Sharing             Self-understanding and gain insight              Acceptance and belonging            Recognize they are not alone                Socialization             Empowerment       Encouragement    Topic:  [x] Spiritual Wellness and [facility]                  [] Hope                     [] Connecting with Divine/Others        [] Thankfulness and Gratitude               [x]  Meaningfulness and Purpose               [] Forgiveness               [] Peace               [x] Connect to [facility]     [] Other:    Participation Level:   [x] Active Listener   [] Minimal   [] Monopolizing   [] Interactive   [] No Participation   []  Other:     Attention:   [x] Alert   [] Distractible   [] Drowsy   [] Poor   [] Other:    Manner:   [x] Cooperative   [] Suspicious   [] Withdrawn   [] Guarded   [] Irritable   [] Inhospitable   [] Other:     Others Comments from Group:   Group was very interactive and positive in building each other up. Margaret Serna did two group activities.  Pt chose the word Kind as his word for the year. Nay Maurer was present as well.

## 2022-01-11 NOTE — PROGRESS NOTES
[facility]  Initial Interdisciplinary Treatment Plan NOTE    Review Date & Time: 1/11/22 1346    Patient was in treatment team.  See Multidisciplinary Treatment Team sheet for participants. Admission Type:   Admission Type: Involuntary    Reason for admission:  Reason for Admission: \"I tried to kill myself with a hose and exhaust.\"      Estimated Length of Stay Update:  3-5 days  Estimated Discharge Date Update: 3-5 days    PATIENT STRENGTHS:  Patient Strengths Strengths: Positive Support  Patient Strengths and Limitations:Limitations: Difficulty problem solving/relies on others to help solve problems  Addictive Behavior:   Medical Problems:  Past Medical History:   Diagnosis Date    Psychiatric problem        EDUCATION:   Learner Progress Toward Treatment Goals: Reviewed goals and plan of care    Method: Individual    Outcome: Verbalized understanding    PATIENT GOALS: \"To go home with my medications being right. \"    PLAN/TREATMENT RECOMMENDATIONS UPDATE:   1. What is the most important thing we can help you with while you are here? \"To go home with my medications being right. \"  2. Who is your support system? Girlfriend and family. 3. Do you have follow-up providers? None currently, possibly Vicki. 4. Do you have the ability to pay for your medications? Needs signed up for Medicaid  5. Where will you be residing when you leave the hospital? Back at home with girlfriend  6. Will need a return to work slip or FMLA paper completion?  Yes      GOALS UPDATE:   Time frame for Short-Term Goals: Daily    Jillian Cortez

## 2022-01-11 NOTE — PLAN OF CARE
Patient has attended and/or participated in 2 groups today and has visited out on the unit this afternoon so he has met his socialization goal for this shift.

## 2022-01-11 NOTE — GROUP NOTE
Group Therapy Note    Date: 1/11/2022    Group Start Time: 1400  Group End Time: 0  Group Topic: Healthy Living/Wellness    [facility]    Chad Tamez LPN        Group Therapy Note    Attendees: 10         Notes:  attended    Status After Intervention:  Improved    Participation Level:  Active Listener    Participation Quality: Appropriate and Attentive      Speech:  normal      Thought Process/Content: Logical      Affective Functioning: Flat      Level of consciousness:  Alert, Oriented x4 and Attentive      Response to Learning: Able to verbalize/acknowledge new learning, Able to retain information and Capable of insight      Endings: None Reported    Modes of Intervention: Education and Reality-testing      Discipline Responsible: Licensed Practical Nurse      Signature:  Chad Tamez LPN

## 2022-01-12 PROCEDURE — 6370000000 HC RX 637 (ALT 250 FOR IP): Performed by: PSYCHIATRY & NEUROLOGY

## 2022-01-12 PROCEDURE — 1240000000 HC EMOTIONAL WELLNESS R&B

## 2022-01-12 RX ADMIN — TRAZODONE HYDROCHLORIDE 50 MG: 50 TABLET ORAL at 20:48

## 2022-01-12 RX ADMIN — FLUOXETINE HYDROCHLORIDE 60 MG: 20 CAPSULE ORAL at 08:47

## 2022-01-12 ASSESSMENT — PAIN SCALES - GENERAL
PAINLEVEL_OUTOF10: 0
PAINLEVEL_OUTOF10: 0

## 2022-01-12 NOTE — PLAN OF CARE
Patient has attended at least 2 groups today and has been out of his room at times for social interaction so he has met his socialization goal.

## 2022-01-12 NOTE — GROUP NOTE
Group Therapy Note    Date: 1/11/2022    Group Start Time: 2000  Group End Time: 2020  Group Topic: Wrap-Up    [facility]    Bernard Cuellar RN        Group Therapy Note    Attendees: yes         Patient's Goal:  [place] to unit, get some sleep.      Notes:  Goal Met      Signature:  Bernard Cuellar RN

## 2022-01-12 NOTE — GROUP NOTE
Group Therapy Note    Date: 1/12/2022    Group Start Time: 1330  Group End Time: 1400  Group Topic: Psychotherapy    [facility]    HELENE Salmeron        Group Therapy Note    Attendees: 6         Encouraged patient to attend group. Patient did not attend.     Signature:  HELENE Salmeron

## 2022-01-12 NOTE — PROGRESS NOTES
Daily Progress Note  Linh Frias MD  1/12/2022    Reviewed patient's current plan of care and vital signs with nursing staff. Sleep:  8.5 hours last night  Attending groups: Yes  No reported Suicidal thought; No interaction with peers & staff; Mood 5 on a scale of 1 to 10 with 10 is feeling normal.  He denies feeling depressed or anxious but rated his mood yesterday at 5/10. He reports today that his mood is 7/10. He has a lot of support. SUBJECTIVE:    Patient is feeling better. SUICIDAL IDEATION denies suicidal ideation. Patient does not have medication side effects. ROS: Patient has new complaints:  No  Sleeping adequately:  Yes  Visitors: Yes    Mental Status Examination:  Patient is cooperative. Speech: Normal rate and tone. No abnormal movements, tics or mannerisms. Mood depressed; affect Restricted. Suicidal ideation Absent. Homicidal ideations Absent. Hallucinations Absent. Delusions Absent. Thought Content: normal. Thought Processes: Goal-Directed. Alert and oriented X 3. Attention and concentration fair. MEMORY intact. Insight and Judgement Limited. Data   height is 5' 11\" (1.803 m) and weight is 190 lb (86.2 kg). His tympanic temperature is 99.2 °F (37.3 °C). His blood pressure is 164/111 (abnormal) and his pulse is 86. His respiration is 18 and oxygen saturation is 96%.    Labs:            Medications  Current Facility-Administered Medications: acetaminophen (TYLENOL) tablet 650 mg, 650 mg, Oral, Q4H PRN  ibuprofen (ADVIL;MOTRIN) tablet 400 mg, 400 mg, Oral, Q6H PRN  polyethylene glycol (GLYCOLAX) packet 17 g, 17 g, Oral, Daily PRN  aluminum & magnesium hydroxide-simethicone (MAALOX) 200-200-20 MG/5ML suspension 30 mL, 30 mL, Oral, Q6H PRN  traZODone (DESYREL) tablet 50 mg, 50 mg, Oral, Nightly PRN  hydrOXYzine (ATARAX) tablet 50 mg, 50 mg, Oral, TID PRN  FLUoxetine (PROZAC) capsule 60 mg, 60 mg, Oral, Daily    ASSESSMENT  MDD (major depressive disorder), recurrent severe, without psychosis ([facility]     PLAN  Patient's symptoms are improving  Continue with current medications. Attempt to develop insight  Psycho-education conducted. Supportive Therapy conducted.

## 2022-01-13 PROCEDURE — 6370000000 HC RX 637 (ALT 250 FOR IP): Performed by: PSYCHIATRY & NEUROLOGY

## 2022-01-13 PROCEDURE — 1240000000 HC EMOTIONAL WELLNESS R&B

## 2022-01-13 RX ADMIN — TRAZODONE HYDROCHLORIDE 50 MG: 50 TABLET ORAL at 21:21

## 2022-01-13 RX ADMIN — FLUOXETINE HYDROCHLORIDE 60 MG: 20 CAPSULE ORAL at 07:37

## 2022-01-13 ASSESSMENT — PAIN SCALES - GENERAL
PAINLEVEL_OUTOF10: 0
PAINLEVEL_OUTOF10: 0

## 2022-01-13 NOTE — PROGRESS NOTES
Patient was pleasant and cooperative this shift.   Patient was out on unit with peers and attended groups

## 2022-01-13 NOTE — BH NOTE
24 hour chart review completed.
24 hour chart review completed.
Group Therapy Note    Date: 1/11/2022  Start Time:  1000  End Time:   1030    Number of Participants:   8    Type of Group: Recreational    Patient's Goal:  Increase socialization. Notes:   Patient participated in a self-esteem drawing activity and shared with others in the group.     Status After Intervention:  Improved    Participation Level: Interactive    Participation Quality: Appropriate, Attentive and Sharing      Speech:  normal      Thought Process/Content: Logical      Affective Functioning: Congruent      Mood: euthymic      Level of consciousness:  Oriented x4      Response to Learning: Progressing to goal      Endings: None Reported    Modes of Intervention: Socialization, Exploration and Activity      Discipline Responsible: Psychoeducational Specialist      Signature:  Nile Al
Group Therapy Note    Date: 1/12/2022  Start Time:  1000  End Time:   1050    Number of Participants: 11    Type of Group: Recreational/Social      Patient's Goal:  Increase socialization and concentration. Notes:    Patient participated in a group discussion and also a craft activity. Patient was an active listener. Status After Intervention:  Improved    Participation Level: Active Listener in group discussion and an active participant in a craft.      Participation Quality: Appropriate and Attentive      Speech:  hesitant      Thought Process/Content: Logical      Affective Functioning: Blunted      Mood: euthymic      Level of consciousness:  Oriented x4      Response to Learning: Progressing to goal      Endings: None Reported    Modes of Intervention: Support, Socialization, Exploration and Activity      Discipline Responsible: Psychoeducational Specialist      Signature:  Fritz William
Group Therapy Note    Date: 1/13/2022  Start Time:  1000  End Time:   1030  Number of Participants: 10    Type of Group: Recreational/Coping Skills    Patient's Goal: Increase knowledge of positive coping skills. Notes:  Patient participated in coping skills pictionary and was able to identify multiple positive coping skills.      Status After Intervention:  Improved    Participation Level: Interactive    Participation Quality: Appropriate, Attentive and Sharing      Speech:  normal      Thought Process/Content: Logical      Affective Functioning: Congruent      Mood: euthymic      Level of consciousness:  Oriented x4      Response to Learning: Progressing to goal      Endings: None Reported    Modes of Intervention: Education, Support, Socialization, Exploration and Activity      Discipline Responsible: Psychoeducational Specialist      Signature:  Nina Camacho
PLAN OF CARE:     Start Time: 0900  End Time:  0930    Group Topic:  Daily Goals    Group Type:   Goal Group    Intervention/Goal:  To increase support and identify daily goals    Attendance:  Attended group    Affect:   restricted    Behavior:  cooperative but guarded    Response:  appropriate    Daily Goal:   Keep working towards getting better and to go home.      Progress:  Progressing to goal
PLAN OF CARE:     Start Time: 0900  End Time:  0930    Group Topic:  Daily Goals    Group Type:   Goal Group    Intervention/Goal:  To increase support and identify daily goals    Attendance:  attended     Affect:    blunt    Behavior:  Cooperative but evasive    Response:  Appropriate    Daily Goal:    Come to groups. Talk to the doctor.      Progress: Progressing to goal
Patient did not attend the goal group/community meeting this morning. Patient did identify a daily goal for today:   \"[place] to unit. Come to groups. Take a nap. \"
COMMUNICATION:   No problems  WRITTEN COMMUNICATION:   No problems noted    COORDINATION:  No problems noted  MOBILITY:  Ambulates independently   GOALS:   Identify 2 new positive coping skills by time of discharge.

## 2022-01-13 NOTE — PROGRESS NOTES
Group Therapy Note    Date: 1/13/2022  Start Time: 1330  End Time:  5590  Number of Participants: 11    Type of Group: Psychotherapy      Notes:  Pt is present for group as peers shared their personal experience with mental illness. Peers explored the importance of feeling as though they have choice or a voice in their well being. Peers also explored how challenges can promote recovery and wellness once embraced. Status After Intervention:  Improved    Participation Level:  Active Listener and Interactive    Participation Quality: Appropriate, Attentive, Sharing and Supportive      Speech:  normal      Thought Process/Content: Logical  Linear      Affective Functioning: Congruent and Flat      Mood: dysphoric      Level of consciousness:  Alert, Oriented x4 and Attentive      Response to Learning: Able to verbalize current knowledge/experience, Able to verbalize/acknowledge new learning, Able to retain information, Capable of insight, Able to change behavior and Progressing to goal      Endings: None Reported    Modes of Intervention: Education, Support, Socialization, Exploration, Clarifying, Problem-solving and Activity      Discipline Responsible: /Counselor      Signature:  Perla Bland

## 2022-01-13 NOTE — GROUP NOTE
Group Therapy Note    Date: 1/13/2022    Group Start Time: 1030  Group End Time: 1100  Group Topic: Healthy Living/Wellness    [facility]    Eduard Han LPN        Group Therapy Note    Attendees: 7       Notes:  Did not attend    Discipline Responsible: Licensed Practical Nurse      Signature:  Eduard Han LPN

## 2022-01-13 NOTE — GROUP NOTE
Group Therapy Note    Date: 1/12/2022    Group Start Time: 2000  Group End Time: 2020  Group Topic: Wrap-Up    [facility]    France Feng RN        Group Therapy Note    Attendees: 13         Patient's Goal:  Keep  working towards getting better and go home    Notes:  Goal met      Signature:  France Feng RN

## 2022-01-13 NOTE — PATIENT CARE CONFERENCE
[facility]  Day 3 Interdisciplinary Treatment Plan NOTE    Review Date & Time: 1/13/2022 1542    Patient was in treatment team    Admission Type:   Admission Type: Involuntary    Reason for admission:  Reason for Admission: \"I tried to kill myself with a hose and exhaust.\"  Estimated Length of Stay Update:  3-5 days  Estimated Discharge Date Update: 3-5 days    PATIENT STRENGTHS:  Patient Strengths Strengths: Positive Support  Patient Strengths and Limitations:Limitations: Difficulty problem solving/relies on others to help solve problems  Addictive Behavior:   Medical Problems:  Past Medical History:   Diagnosis Date    Psychiatric problem        Risk:  Fall Risk   Jt Scale Jt Scale Score: 22  BVC    Change in scores no. Changes to plan of Care no    Status EXAM:   Status and Exam  Normal: No  Facial Expression: Flat  Affect: Blunt  Level of Consciousness: Alert  Mood:Normal: No  Mood: Depressed,Anxious,Sad  Motor Activity:Normal: Yes  Interview Behavior: Cooperative  Preception: [place] to Person,[place] to Time,[place] to Place,[place] to Situation  Attention:Normal: Yes  Thought Processes: Circumstantial  Thought Content:Normal: Yes  Hallucinations: None  Delusions: No  Memory:Normal: Yes  Insight and Judgment: No  Insight and Judgment: Poor Judgment  Present Suicidal Ideation: No  Present Homicidal Ideation: No    Daily Assessment Last Entry:   Daily Sleep (WDL): Within Defined Limits         Patient Currently in Pain: Denies  Daily Nutrition (WDL): Within Defined Limits    Patient Monitoring:  Frequency of Checks: 4 times per hour, close    Psychiatric Symptoms:   Depression Symptoms  Depression Symptoms: No problems reported or observed. Anxiety Symptoms  Anxiety Symptoms: No problems reported or observed. Gina Symptoms  Gina Symptoms: No problems reported or observed. Psychosis Symptoms  Hallucination Type: No problems reported or observed.   Delusion Type: No problems reported or observed. Suicide Risk CSSR-S:  1) Within the past month, have you wished you were dead or wished you could go to sleep and not wake up? : Yes  2) Have you actually had any thoughts of killing yourself? : Yes  3) Have you been thinking about how you might kill yourself? : Yes  5) Have you started to work out or worked out the details of how to kill yourself? Do you intend to carry out this plan? : Yes  6) Have you ever done anything, started to do anything, or prepared to do anything to end your life?: Yes  Change in Result no Change in Plan of care no      EDUCATION:   Learner Progress Toward Treatment Goals: Reviewed results and recommendations of this team, Reviewed group plan and strategies and Reviewed goals and plan of care    Method: Individual    Outcome: Verbalized understanding and Demonstrated Understanding    PATIENT GOALS: To go home with my medications being right    PLAN/TREATMENT RECOMMENDATIONS UPDATE:  1. How are you progressing toward meeting your main treatment goal?   Patient reports medication changes have successfully been made  2. Are there discharge barriers/lingering problems that need to be addressed? Denies       3. Do you have the ability to pay for your medications? No insurance on file      4. How is your group participation?      Patient has attended and participated in most groups    GOALS UPDATE:   Time frame for Short-Term Goals: Daily      HELENE Santos

## 2022-01-14 VITALS
RESPIRATION RATE: 16 BRPM | OXYGEN SATURATION: 97 % | WEIGHT: 190 LBS | BODY MASS INDEX: 26.6 KG/M2 | HEIGHT: 71 IN | DIASTOLIC BLOOD PRESSURE: 91 MMHG | SYSTOLIC BLOOD PRESSURE: 134 MMHG | TEMPERATURE: 97.2 F | HEART RATE: 70 BPM

## 2022-01-14 PROCEDURE — 5130000000 HC BRIDGE APPOINTMENT

## 2022-01-14 PROCEDURE — 6370000000 HC RX 637 (ALT 250 FOR IP): Performed by: PSYCHIATRY & NEUROLOGY

## 2022-01-14 RX ORDER — HYDROXYZINE 50 MG/1
50 TABLET, FILM COATED ORAL 3 TIMES DAILY PRN
Qty: 90 TABLET | Refills: 0 | Status: SHIPPED | OUTPATIENT
Start: 2022-01-14 | End: 2022-02-13

## 2022-01-14 RX ORDER — TRAZODONE HYDROCHLORIDE 50 MG/1
50 TABLET ORAL NIGHTLY PRN
Qty: 30 TABLET | Refills: 0 | Status: SHIPPED | OUTPATIENT
Start: 2022-01-14

## 2022-01-14 RX ORDER — FLUOXETINE HYDROCHLORIDE 20 MG/1
60 CAPSULE ORAL DAILY
Qty: 90 CAPSULE | Refills: 0 | Status: SHIPPED | OUTPATIENT
Start: 2022-01-15

## 2022-01-14 RX ADMIN — FLUOXETINE HYDROCHLORIDE 60 MG: 20 CAPSULE ORAL at 08:24

## 2022-01-14 ASSESSMENT — PAIN SCALES - GENERAL: PAINLEVEL_OUTOF10: 0

## 2022-01-14 NOTE — GROUP NOTE
Group Therapy Note    Date: 1/14/2022    Group Start Time: 36  Group End Time: 1200  Group Topic: Healthy Living/Wellness    [facility]    Zane Santana LPN        Group Therapy Note    Attendees: 8           Notes:  attended    Status After Intervention:  Improved    Participation Level:  Active Listener    Participation Quality: Appropriate and Attentive      Speech:  normal      Thought Process/Content: Logical      Affective Functioning: Flat          Level of consciousness:  Alert, Oriented x4 and Attentive      Response to Learning: Able to verbalize current knowledge/experience, Able to verbalize/acknowledge new learning and Able to retain information      Endings: None Reported    Modes of Intervention: Education, Support and Socialization      Discipline Responsible: Licensed Practical Nurse      Signature:  Zane Santana LPN

## 2022-01-14 NOTE — PROGRESS NOTES
Discharge planning- called [facility] and was told to have client come in during their hours to get paperwork and they will offer open access information at that time.

## 2022-01-14 NOTE — PROGRESS NOTES
Daily Progress Note  Kassidy Overton MD  1/14/2022    Reviewed patient's current plan of care and vital signs with nursing staff. Sleep:  8 hours last night broken  Attending groups: Yes  No reported Suicidal thought; Good interaction with peers & staff; Mood 9 on a scale of 1 to 10 with 10 is feeling normal.  He is hopeful with good support. He feels ready to be discharged home. SUBJECTIVE:    Patient is feeling better. SUICIDAL IDEATION denies suicidal ideation. Patient does not have medication side effects. ROS: Patient has new complaints:  No  Sleeping adequately:  Yes  Visitors: Yes    Mental Status Examination:  Patient is cooperative. Speech: Normal rate and tone. No abnormal movements, tics or mannerisms. Mood euthymic; affect appropriate. Suicidal ideation Absent. Homicidal ideations Absent. Hallucinations Absent. Delusions Absent. Thought Content: normal. Thought Processes: Goal-Directed. Alert and oriented X 3. Attention and concentration fair. MEMORY intact. Insight and Judgement fair. Data   height is 5' 11\" (1.803 m) and weight is 190 lb (86.2 kg). His tympanic temperature is 97.5 °F (36.4 °C). His blood pressure is 137/92 (abnormal) and his pulse is 70. His respiration is 18 and oxygen saturation is 97%.    Labs:            Medications  Current Facility-Administered Medications: acetaminophen (TYLENOL) tablet 650 mg, 650 mg, Oral, Q4H PRN  ibuprofen (ADVIL;MOTRIN) tablet 400 mg, 400 mg, Oral, Q6H PRN  polyethylene glycol (GLYCOLAX) packet 17 g, 17 g, Oral, Daily PRN  aluminum & magnesium hydroxide-simethicone (MAALOX) 200-200-20 MG/5ML suspension 30 mL, 30 mL, Oral, Q6H PRN  traZODone (DESYREL) tablet 50 mg, 50 mg, Oral, Nightly PRN  hydrOXYzine (ATARAX) tablet 50 mg, 50 mg, Oral, TID PRN  FLUoxetine (PROZAC) capsule 60 mg, 60 mg, Oral, Daily    ASSESSMENT  MDD (major depressive disorder), recurrent severe, without psychosis ([facility]     PLAN  Patient's symptoms are improving  Continue with current medications. Attempt to develop insight  Psycho-education conducted. Supportive Therapy conducted.   Probable discharge is today  Follow-up @ [facility]

## 2022-01-14 NOTE — PROGRESS NOTES
Group Therapy Note    Date: 1/13/2022  Start Time: 2000  End Time:  2020    Type of Group: Wrap-Up/Relaxation    Status After Intervention:  Unchanged    Participation Level:  Active Listener    Participation Quality: Attentive      Speech:  normal      Thought Process/Content: Logical      Affective Functioning: Congruent      Mood: euthymic      Level of consciousness:  Alert      Response to Learning: Able to verbalize current knowledge/experience      Endings: None Reported    Modes of Intervention: Support      Discipline Responsible: Registered Nurse      Signature:  Marah Medrano RN

## 2022-01-14 NOTE — DISCHARGE SUMMARY
Physician Discharge Summary     Patient ID:  Jemal Saldivar  [id number]  66 y.o.  1985    Admit date: 1/10/2022    Discharge date and time: 1/14/2022  8:58 AM     Admitting Physician: Lake Luna MD     Discharge Physician: Faye Magana MD      Admission Diagnoses: Suicide attempt [facility]) Lesli Cornejo  MDD (major depressive disorder), recurrent episode ([facility] [F33.9]  Depression with suicidal ideation [F32. A, J18.980]  Acute alcoholic intoxication without complication ([facility] [E07.216]  MDD (major depressive disorder), recurrent severe, without psychosis ([place] [address].) [F33.2]    IDENTIFYING INFORMATION: The patient is a 35-year-old single   male. He has no children. He lives with his girlfriend. He works at  [employer]. HISTORY OF PRESENT ILLNESS: The patient was brought to [address] [place] [address] ED by his sister due to suicide attempt. He woke up the  day before and was having suicidal thoughts. He talked to his girlfriend who  left for work. He drank heavily and went to his garage and put a hose  between his exhaust and his windows. His girlfriend called his mother  to check on him. His mother found him in his car in his garage. She  called his sister. His sister decided to bring him to the emergency  room for help. He reports a history of depression for about 20 years. He has been on a small dose of fluoxetine 20 mg for two years. He  reports trouble getting to sleep. He feels hopeless and worthless at  times. He does not enjoy things as much. He has been having suicidal  thoughts for many years. He reports there is no specific trigger for  his suicide attempt or his worsening depressive symptoms although he  says that his girlfriend's daughter who is in her late 25s has been  staying with them on and off. He denies hypomanic or manic symptoms. He feels irritable at times. No psychotic symptoms. He feels anxious  most of the time and he worries a lot. He denies anxiety attack  symptoms. He denies history of abuse or trauma. MENTAL STATUS EXAMINATION AT ADMISSION: See H and P. Discharge Diagnoses:   MDD (major depressive disorder), recurrent severe, without psychosis ([address]     Past Medical History:   Diagnosis Date    Psychiatric problem         Admission Condition: poor    Discharged Condition: stable    Indication for Admission: threat to self    Significant Diagnostic Studies:   See Results Review tab in EHR      TREATMENT AND CLINICAL COURSE:   Patient was admitted on the unit. Routine lab was ordered. Physical examination was within normal limits. At admission, I resumed and increased fluoxetine; Hydroxyzine & Trazodone were added. Patient did not have side effect from medications. Patient was involved in group and milieu therapy. Although patient was suicidal upon admission, patient did not have suicidal thought during this hospital stay. I strongly encouraged patient's sobriety from illicit drug use and alcohol. I spent some time discussing the effect of illicit drug & alcohol on patient's mood. We discussed about smoking cessation. He is willing to follow-up outpatient at [facility] behavioral health for individual counseling and med somatic. Toward the end of the hospital stay, patient become more hopeful. Overall, hospital stay was uncomplicated, and patient was discharged in stable condition. Consults: none    Treatments: Psychotropic medications, therapy with group, milieu, and 1:1 with nurses, social workers and Attending physician.       Discharge Medications:  Current Discharge Medication List      START taking these medications    Details   hydrOXYzine (ATARAX) 50 MG tablet Take 1 tablet by mouth 3 times daily as needed for Anxiety  Qty: 90 tablet, Refills: 0      traZODone (DESYREL) 50 MG tablet Take 1 tablet by mouth nightly as needed for Sleep  Qty: 30 tablet, Refills: 0         CONTINUE these medications which have CHANGED    Details   FLUoxetine (PROZAC) 20 MG capsule Take 3 capsules by mouth daily  Qty: 90 capsule, Refills: 0                MENTAL STATUS EXAMINATION AT DISCHARGE: Patient is cooperative. Speech: Normal rate and tone. No abnormal movements, tics or mannerisms. Mood euthymic; affect appropriate. Suicidal ideation Absent. Homicidal ideations Absent. Hallucinations Absent. Delusions Absent. Thought Content: normal. Thought Processes: Goal-Directed. Alert and oriented X 3. Attention and concentration fair. MEMORY intact. Insight and Judgement fair. Disposition: Home    Patient Instructions: Activity: As tolerated  Diet: regular diet    Follow-up as scheduled with [facility]     Time Spent on discharge in examination, evaluation, counseling and review of medications and discharge plan: Less than 30 minutes. Engagement: Patient displayed a good level of engagement with the treatments offered during this admission.        Signed:  Electronically signed by Mercedes Coreas MD on 1/14/2022 at 8:58 AM

## 2022-01-14 NOTE — PROGRESS NOTES
This RN has reviewed and agrees with Gopal Figueroa LPN's data collection and has collaborated with this LPN regarding the patient's care plan.

## 2022-01-14 NOTE — PROGRESS NOTES
[facility] Behavioral Health   Discharge Note    Pt discharged with followings belongings:   Vision - Corrective Lenses: Glasses  Clothing: Mary Mullet / coat,Shirt,Pajamas (SHOES, PJ PANTS, TSHIRT, COAT,)  Other Valuables: Cell phone,Wallet (WALLET, CELLPHONE / Stefaniemagdi Tesfaye,  $000)   Valuables sent home with Patient. Valuables retrieved from safe, Security envelope number:  NA and returned to patient. Patient left department with Writer via Ambulation. Discharged to Home with Mom. \"An Important Message from [employer] About Your Rights\" form photocopy original from admission and provided to pt at discharge NA. Patient education on aftercare instructions: yes  Reviewed Discharge Instructions with patient/family/nursing facility. Patient/family verbalizes understanding and agreement with the discharge plan using the teachback method. Information faxed to NA by NA Patient/family verbalize understanding of AVS:  Yes.     Status EXAM upon discharge:  Status and Exam  Normal: No  Facial Expression: Brightened  Affect: Blunt  Level of Consciousness: Alert  Mood:Normal: Yes  Mood: Other (Comment) (Euthymic)  Motor Activity:Normal: Yes  Interview Behavior: Cooperative  Preception: [place] to Person,[place] to Time,[place] to Place,[place] to Situation  Attention:Normal: Yes  Thought Processes: Circumstantial  Thought Content:Normal: Yes  Hallucinations: None (Patient Denies)  Delusions: No  Memory:Normal: Yes  Insight and Judgment: No  Insight and Judgment: Poor Judgment,Poor Insight  Present Suicidal Ideation: No (Patient Denies)  Present Homicidal Ideation: No (Patient Denies)    Rainer Tapia RN

## 2022-01-14 NOTE — PROGRESS NOTES
Alert and oriented x4. Speech clear. Pleasant and cooperative with care and unit routine. States that he is looking forward to going home tomorrow. Denies suicidal or homicidal thoughts or plans. No hallucinations or delusions. Had good peer interactions. At a snack this shift.

## 2022-01-14 NOTE — PLAN OF CARE
Problem: Depressive Behavior With or Without Suicide Precautions:  Goal: Able to verbalize and/or display a decrease in depressive symptoms  Description: Able to verbalize and/or display a decrease in depressive symptoms  Outcome: Completed  Goal: Ability to disclose and discuss suicidal ideas will improve  Description: Ability to disclose and discuss suicidal ideas will improve  Outcome: Completed  Goal: Able to verbalize support systems  Description: Able to verbalize support systems  Outcome: Completed  Goal: Absence of self-harm  Description: Absence of self-harm  Outcome: Completed  Goal: Participates in care planning  Description: Participates in care planning  Outcome: Completed     Problem: Coping - Ineffective, Individual:  Goal: Ability to identify and develop effective coping behavior will improve  Description: Ability to identify and develop effective coping behavior will improve  Outcome: Completed     Problem: Discharge Planning:  Goal: Discharged to appropriate level of care  Description: Discharged to appropriate level of care  Outcome: Completed     Problem: Anxiety:  Goal: Level of anxiety will decrease  Description: Level of anxiety will decrease  Outcome: Completed     Problem:  Activity:  Goal: Sleeping patterns will improve  Description: Sleeping patterns will improve  Outcome: Completed     Problem: Coping:  Goal: Ability to identify problematic behaviors that deter socialization will improve  Description: Ability to identify problematic behaviors that deter socialization will improve  Outcome: Completed

## 2022-01-17 ENCOUNTER — TELEPHONE (OUTPATIENT)
Dept: PSYCHIATRY | Age: 37
End: 2022-01-17

## 2022-10-17 NOTE — PROGRESS NOTES
Daily Progress Note  Shashi John MD  1/13/2022    Reviewed patient's current plan of care and vital signs with nursing staff. Sleep:  8.5 hours last night  Attending groups: Yes  No reported Suicidal thought; some interaction with peers & staff; Mood 8 on a scale of 1 to 10 with 10 is feeling normal.  He is hopeful with good support. According to staff he is brighter. SUBJECTIVE:    Patient is feeling better. SUICIDAL IDEATION denies suicidal ideation. Patient does not have medication side effects. ROS: Patient has new complaints:  No  Sleeping adequately:  Yes  Visitors: Yes    Mental Status Examination:  Patient is cooperative. Speech: Normal rate and tone. No abnormal movements, tics or mannerisms. Mood dysthymic; affect appropriate. Suicidal ideation Absent. Homicidal ideations Absent. Hallucinations Absent. Delusions Absent. Thought Content: normal. Thought Processes: Goal-Directed. Alert and oriented X 3. Attention and concentration fair. MEMORY intact. Insight and Judgement Limited. Data   height is 5' 11\" (1.803 m) and weight is 190 lb (86.2 kg). His tympanic temperature is 99.2 °F (37.3 °C). His blood pressure is 139/100 (abnormal) and his pulse is 77. His respiration is 16 and oxygen saturation is 97%.    Labs:            Medications  Current Facility-Administered Medications: acetaminophen (TYLENOL) tablet 650 mg, 650 mg, Oral, Q4H PRN  ibuprofen (ADVIL;MOTRIN) tablet 400 mg, 400 mg, Oral, Q6H PRN  polyethylene glycol (GLYCOLAX) packet 17 g, 17 g, Oral, Daily PRN  aluminum & magnesium hydroxide-simethicone (MAALOX) 200-200-20 MG/5ML suspension 30 mL, 30 mL, Oral, Q6H PRN  traZODone (DESYREL) tablet 50 mg, 50 mg, Oral, Nightly PRN  hydrOXYzine (ATARAX) tablet 50 mg, 50 mg, Oral, TID PRN  FLUoxetine (PROZAC) capsule 60 mg, 60 mg, Oral, Daily    ASSESSMENT  MDD (major depressive disorder), recurrent severe, without psychosis ([facility] [address]     PLAN  Patient's symptoms are improving  Continue with Patient is calling because she is due for another EGD. Patient stated that she had a referral sent over for this issue but the call center has not received it. Patient would like to schedule. Please call back Cynthia.   current medications. Attempt to develop insight  Psycho-education conducted. Supportive Therapy conducted.   Probable discharge is tomorrow  Follow-up @ [facility]